# Patient Record
Sex: FEMALE | Race: BLACK OR AFRICAN AMERICAN | NOT HISPANIC OR LATINO | URBAN - METROPOLITAN AREA
[De-identification: names, ages, dates, MRNs, and addresses within clinical notes are randomized per-mention and may not be internally consistent; named-entity substitution may affect disease eponyms.]

---

## 2022-02-21 ENCOUNTER — EMERGENCY (EMERGENCY)
Facility: HOSPITAL | Age: 37
LOS: 1 days | Discharge: HOME | End: 2022-02-21
Attending: STUDENT IN AN ORGANIZED HEALTH CARE EDUCATION/TRAINING PROGRAM
Payer: COMMERCIAL

## 2022-02-21 VITALS
DIASTOLIC BLOOD PRESSURE: 73 MMHG | OXYGEN SATURATION: 100 % | TEMPERATURE: 98 F | RESPIRATION RATE: 21 BRPM | HEART RATE: 81 BPM | WEIGHT: 184.97 LBS | SYSTOLIC BLOOD PRESSURE: 120 MMHG | HEIGHT: 69 IN

## 2022-02-21 VITALS
SYSTOLIC BLOOD PRESSURE: 115 MMHG | RESPIRATION RATE: 18 BRPM | DIASTOLIC BLOOD PRESSURE: 76 MMHG | OXYGEN SATURATION: 98 % | HEART RATE: 62 BPM

## 2022-02-21 PROCEDURE — 99284 EMERGENCY DEPT VISIT MOD MDM: CPT

## 2022-02-21 PROCEDURE — 99283 EMERGENCY DEPT VISIT LOW MDM: CPT

## 2022-02-21 RX ORDER — DIAZEPAM 5 MG
5 TABLET ORAL ONCE
Refills: 0 | Status: DISCONTINUED | OUTPATIENT
Start: 2022-02-21 | End: 2022-02-21

## 2022-02-21 RX ORDER — LIDOCAINE 4 G/100G
1 CREAM TOPICAL ONCE
Refills: 0 | Status: COMPLETED | OUTPATIENT
Start: 2022-02-21 | End: 2022-02-21

## 2022-02-21 RX ORDER — ACETAMINOPHEN 500 MG
975 TABLET ORAL ONCE
Refills: 0 | Status: COMPLETED | OUTPATIENT
Start: 2022-02-21 | End: 2022-02-21

## 2022-02-21 RX ADMIN — LIDOCAINE 1 PATCH: 4 CREAM TOPICAL at 19:22

## 2022-02-21 RX ADMIN — Medication 5 MILLIGRAM(S): at 19:22

## 2022-02-21 RX ADMIN — Medication 975 MILLIGRAM(S): at 19:23

## 2022-02-21 NOTE — ED ADULT NURSE REASSESSMENT NOTE - NS ED NURSE REASSESS COMMENT FT1
MD informed this RN that pt is ok to be discharged. Pt still endorses left lower back pain but is ok to f/u outpatient.   RN assisted pt with putting her clothing on. PCT assisted pt into wheelchair to the waiting room. Pt left with all of her personal belongings.

## 2022-02-21 NOTE — ED PROVIDER NOTE - PATIENT PORTAL LINK FT
You can access the FollowMyHealth Patient Portal offered by [facility] by registering at the following website: [email or web address]. By joining [facility]’s FollowMyHealth portal, you will also be able to view your health information using other applications (apps) compatible with our system.

## 2022-02-21 NOTE — ED PROVIDER NOTE - OBJECTIVE STATEMENT
36 y.o female with pmhx of sciatica presents to the ED with c.o severe L hip pain. Pt states pain started 9 days ago, explaining she felt the muscles around her hip feel tight. She denies any injury or repetitive motions/heavy lifting. Was able to maintain her normal activity level at work and for exercise until pain gradually worsened. Over the last 3 days her pain has been increasing, and is no longer relived with Naproxen twice a day or Gabapentin. She presents tonight because while she was in the shower she felt her pain get worse, saying it now radiates to her leg, and that it is now difficult to ambulate. States pain is more severe than her usual episodes of sciatica, but endorses numbness which she says is typical for episodes of sciatica. Denies focal weakness.  Pt followed by a rheumatologist, however she denies any personal or family hx of lupus or RA.     She denies any fever, chills, knee pain, back pain, or swelling/redness of the skin of her hip. 36 y.o female with pmhx of sciatica presents to the ED with c.o severe L sacral pain. Pt states pain started 9 days ago, explaining she felt the muscles around her left hip/lower back feel tight. She denies any injury or repetitive motions/heavy lifting. Was able to maintain her normal activity level at work and for exercise until pain gradually worsened. Over the last 3 days her pain has been increasing, and is no longer relived with Naproxen twice a day or Gabapentin. She presents tonight because while she was in the shower she felt her pain get worse, saying it now radiates to her leg, and that it is now difficult to ambulate. States pain is more severe than her usual episodes of sciatica, but endorses numbness which she says is typical for episodes of sciatica. Denies focal weakness.  Pt followed by a rheumatologist, however she denies any personal or family hx of cancer, lupus, or RA. Last took 2 Naproxen at 4pm.  She denies any fever, chills, incontinence, knee pain, or swelling/redness of the skin of her hip.

## 2022-02-21 NOTE — ED PROVIDER NOTE - ATTENDING CONTRIBUTION TO CARE
Attending Ashley: I performed a history and physical exam of the patient and discussed their management with the resident/fellow/ACP/student. I have reviewed the resident/fellow/ACP/student note and agree with the documented findings and plan of care, except as noted. I have personally performed a substantive portion of the visit including all aspects of the medical decision making. My medical decision making and observations are found above. Please refer to any progress notes for updates on clinical course.

## 2022-02-21 NOTE — ED ADULT NURSE NOTE - OBJECTIVE STATEMENT
35 y/o female PMH sciatica presents to ED reporting L hip pain. Pt reports L lower back/hip pain, worsening the past few days. On exam, AOx3, speaking in complete sentences. Unlabored, spontaneous respirations, NAD. Equal strength and sensation in all 4 extremities. Pt denies loss of control of bladder/bowel or numbness/tingling of extremities. Awaiting evaluation by MD at this time.

## 2022-02-21 NOTE — ED ADULT NURSE NOTE - NSIMPLEMENTINTERV_GEN_ALL_ED
Implemented All Fall Risk Interventions:  [place] to call system. Call bell, personal items and telephone within reach. Instruct patient to call for assistance. Room bathroom lighting operational. Non-slip footwear when patient is off stretcher. Physically safe environment: no spills, clutter or unnecessary equipment. Stretcher in lowest position, wheels locked, appropriate side rails in place. Provide visual cue, wrist band, yellow gown, etc. Monitor gait and stability. Monitor for mental status changes and reorient to person, place, and time. Review medications for side effects contributing to fall risk. Reinforce activity limits and safety measures with patient and family.

## 2022-02-21 NOTE — ED PROVIDER NOTE - PHYSICAL EXAMINATION
GENERAL: Awake, alert and interacting appropriately, mild distress, uncomfortable appearing   HEENT: moist mucous membranes  NECK: no visible mass or JVD  CARDIAC: Regular rate and rhythm  PULM: Clear to auscultation bilaterally  ABDOMEN:  nondistended  MSK: +L SI joint tenderness. No midline tenderness. Antalgic gait. Pain exacerbated w/ hip flexion.   NEURO: 5/5 strength hip flexion, dorsiflexion, plantarflexion bilaterally. Sensation intact.    SKIN: No overlaying rash, swelling, or erythema L hip/sacrum.

## 2022-02-21 NOTE — ED ADULT TRIAGE NOTE - CHIEF COMPLAINT QUOTE
left hip pain down left upper thigh left hip pain down left upper thigh since last saturday. denies injury

## 2022-02-21 NOTE — ED PROVIDER NOTE - CLINICAL SUMMARY MEDICAL DECISION MAKING FREE TEXT BOX
adult F w/ a hx of sciatic back pain presenting w/ L LBP w/out red flags for same. vss. neurologically intact. low suspicion for acute cord compression, cauda equina, fracture, infections process of the spine. will tx for likely acute exacerbation of known sciatica. tylenol, valium and lidocaine patch. re-eval. likely d/c w/ spine center follow up. adult F w/ a hx of sciatic back pain presenting w/ L LBP w/out red flags for same. vss. neurologically intact. Do not suspect acute cord compression, cauda equina, fracture, infections process of the spine. will tx for likely acute exacerbation of known sciatica. tylenol, valium and lidocaine patch. re-eval. likely d/c w/ spine center follow up.    Attending Ashley: 37 y/o F w/ PMH of L sided sciatica, b/l tubal ligation presenting w/ back pain. Seen in blue. Pt reports over 1 week ago developed L lower back pain w/ radiation to L leg. Felt similar to previous sciatica pain. Over the past few days and today specifically pain worsened to the point where she is having difficulty walking. Has been taking NSAIDs and gabapentin (prescribed by rheum last week) which has not be providing much pain relief. Denies any numbness or tingling in legs. Endorsing pain causing her legs to feel weak, but has been able to walk despite it being painful. No trauma to the area. No drug use. Pt uncomfortable appearing on exam, but no acute distress. Lungs clear. HR regular. Abd nondistended/soft/nontender. No midline spinal tenderness. Tender superior L glute. Str 5/5 x4. No appreciable extremity sensory deficits. Pt w/ back pain radiating to L leg. Likely sciatica pain given symptoms. No red flag symptoms to suggest cord pathology. Will provide analgesia. Will reassess the need for additional interventions as clinically warranted.

## 2022-02-21 NOTE — ED PROVIDER NOTE - PROGRESS NOTE DETAILS
Pain minimally improved after Tylenol, Valium, lidocaine patch. Steve Silva M.D. (PGY-1): in depth discussion had w/ patient. discussed managing pain control goals. pt understanding. offered admission for pain management, which pt declined. plan = discharge w/ gradual rehabilitation while at home. offered 7 days off work for this, which pt accepted. rapid referral made to spine center for further management of likely sciatica. pt endorses plan and is happy with the discussion we had. ED return precautions discussed.

## 2022-02-21 NOTE — ED PROVIDER NOTE - NSFOLLOWUPINSTRUCTIONS_ED_ALL_ED_FT
Follow up with the Spine Center. You will be called with the details of your appointment within 24-48 hours.  the phone even if you do not recognize the number. Contact the ED if you have difficulties making this appointment.     Immediately return to the Emergency Department for any new or markedly worsening symptoms.    Use information packed handed with your paperwork for recommendations on management of your sciatic pain.

## 2022-02-21 NOTE — ED ADULT NURSE REASSESSMENT NOTE - NS ED NURSE REASSESS COMMENT FT1
Pt a&ox4, spontaneous breathing and equal chest rise. Pt c/o left low back pain. PO meds administered per MD's order. Pt placed on purewick because she states she cannot walk or sit on a bedpan without experiencing severe pain. VSS on RA. Oriented pt to call bell use. Bed locked and in lowest position. Will continue to closely monitor pt.

## 2022-02-21 NOTE — ED PROVIDER NOTE - NS ED ROS FT
CONSTITUTIONAL: No weakness, fatigue, fevers or chills, significant weight loss or gain  HEENT: No neck pain or stiffness  RESPIRATORY: No cough, wheezing, hemoptysis; No shortness of breath  CARDIOVASCULAR: No chest pain or palpitations  GASTROINTESTINAL: No abdominal or epigastric pain  GENITOURINARY: No dysuria, frequency or hematuria  MUSCULOSKELETAL: + hip pain/sacral pain   NEUROLOGIC: + paresthesias, no weakness  ENDOCRINOLOGIC: No polyuria or polydipsia  HEMATOLOGIC: No bruising, bleeding, pallor or jaundice  SKIN: No rashes

## 2023-04-09 ENCOUNTER — EMERGENCY (EMERGENCY)
Facility: HOSPITAL | Age: 38
LOS: 1 days | Discharge: HOME | End: 2023-04-09
Attending: STUDENT IN AN ORGANIZED HEALTH CARE EDUCATION/TRAINING PROGRAM | Admitting: STUDENT IN AN ORGANIZED HEALTH CARE EDUCATION/TRAINING PROGRAM
Payer: COMMERCIAL

## 2023-04-09 VITALS
RESPIRATION RATE: 16 BRPM | SYSTOLIC BLOOD PRESSURE: 120 MMHG | OXYGEN SATURATION: 100 % | DIASTOLIC BLOOD PRESSURE: 78 MMHG | HEART RATE: 81 BPM | TEMPERATURE: 98 F

## 2023-04-09 LAB
ALBUMIN SERPL ELPH-MCNC: 4.2 G/DL — SIGNIFICANT CHANGE UP (ref 3.3–5)
ALP SERPL-CCNC: 63 U/L — SIGNIFICANT CHANGE UP (ref 40–120)
ALT FLD-CCNC: 14 U/L — SIGNIFICANT CHANGE UP (ref 4–33)
ANION GAP SERPL CALC-SCNC: 15 MMOL/L — HIGH (ref 7–14)
AST SERPL-CCNC: 17 U/L — SIGNIFICANT CHANGE UP (ref 4–32)
BASOPHILS # BLD AUTO: 0.02 K/UL — SIGNIFICANT CHANGE UP (ref 0–0.2)
BASOPHILS NFR BLD AUTO: 0.3 % — SIGNIFICANT CHANGE UP (ref 0–2)
BILIRUB SERPL-MCNC: 0.2 MG/DL — SIGNIFICANT CHANGE UP (ref 0.2–1.2)
BUN SERPL-MCNC: 8 MG/DL — SIGNIFICANT CHANGE UP (ref 7–23)
CALCIUM SERPL-MCNC: 8.7 MG/DL — SIGNIFICANT CHANGE UP (ref 8.4–10.5)
CHLORIDE SERPL-SCNC: 105 MMOL/L — SIGNIFICANT CHANGE UP (ref 98–107)
CO2 SERPL-SCNC: 19 MMOL/L — LOW (ref 22–31)
CREAT SERPL-MCNC: 0.84 MG/DL — SIGNIFICANT CHANGE UP (ref 0.5–1.3)
EGFR: 91 ML/MIN/1.73M2 — SIGNIFICANT CHANGE UP
EOSINOPHIL # BLD AUTO: 0.19 K/UL — SIGNIFICANT CHANGE UP (ref 0–0.5)
EOSINOPHIL NFR BLD AUTO: 3.1 % — SIGNIFICANT CHANGE UP (ref 0–6)
FLUAV AG NPH QL: SIGNIFICANT CHANGE UP
FLUBV AG NPH QL: SIGNIFICANT CHANGE UP
GLUCOSE SERPL-MCNC: 100 MG/DL — HIGH (ref 70–99)
HCG SERPL-ACNC: <5 MIU/ML — SIGNIFICANT CHANGE UP
HCT VFR BLD CALC: 41 % — SIGNIFICANT CHANGE UP (ref 34.5–45)
HGB BLD-MCNC: 12.8 G/DL — SIGNIFICANT CHANGE UP (ref 11.5–15.5)
IANC: 3.91 K/UL — SIGNIFICANT CHANGE UP (ref 1.8–7.4)
IMM GRANULOCYTES NFR BLD AUTO: 0.2 % — SIGNIFICANT CHANGE UP (ref 0–0.9)
LYMPHOCYTES # BLD AUTO: 1.07 K/UL — SIGNIFICANT CHANGE UP (ref 1–3.3)
LYMPHOCYTES # BLD AUTO: 17.5 % — SIGNIFICANT CHANGE UP (ref 13–44)
MCHC RBC-ENTMCNC: 26.4 PG — LOW (ref 27–34)
MCHC RBC-ENTMCNC: 31.2 GM/DL — LOW (ref 32–36)
MCV RBC AUTO: 84.5 FL — SIGNIFICANT CHANGE UP (ref 80–100)
MONOCYTES # BLD AUTO: 0.93 K/UL — HIGH (ref 0–0.9)
MONOCYTES NFR BLD AUTO: 15.2 % — HIGH (ref 2–14)
NEUTROPHILS # BLD AUTO: 3.91 K/UL — SIGNIFICANT CHANGE UP (ref 1.8–7.4)
NEUTROPHILS NFR BLD AUTO: 63.7 % — SIGNIFICANT CHANGE UP (ref 43–77)
NRBC # BLD: 0 /100 WBCS — SIGNIFICANT CHANGE UP (ref 0–0)
NRBC # FLD: 0 K/UL — SIGNIFICANT CHANGE UP (ref 0–0)
PLATELET # BLD AUTO: 200 K/UL — SIGNIFICANT CHANGE UP (ref 150–400)
POTASSIUM SERPL-MCNC: 3.3 MMOL/L — LOW (ref 3.5–5.3)
POTASSIUM SERPL-SCNC: 3.3 MMOL/L — LOW (ref 3.5–5.3)
PROT SERPL-MCNC: 7.2 G/DL — SIGNIFICANT CHANGE UP (ref 6–8.3)
RBC # BLD: 4.85 M/UL — SIGNIFICANT CHANGE UP (ref 3.8–5.2)
RBC # FLD: 13.9 % — SIGNIFICANT CHANGE UP (ref 10.3–14.5)
RSV RNA NPH QL NAA+NON-PROBE: SIGNIFICANT CHANGE UP
SARS-COV-2 RNA SPEC QL NAA+PROBE: SIGNIFICANT CHANGE UP
SODIUM SERPL-SCNC: 139 MMOL/L — SIGNIFICANT CHANGE UP (ref 135–145)
WBC # BLD: 6.13 K/UL — SIGNIFICANT CHANGE UP (ref 3.8–10.5)
WBC # FLD AUTO: 6.13 K/UL — SIGNIFICANT CHANGE UP (ref 3.8–10.5)

## 2023-04-09 PROCEDURE — 99284 EMERGENCY DEPT VISIT MOD MDM: CPT

## 2023-04-09 RX ORDER — ONDANSETRON 8 MG/1
4 TABLET, FILM COATED ORAL ONCE
Refills: 0 | Status: COMPLETED | OUTPATIENT
Start: 2023-04-09 | End: 2023-04-09

## 2023-04-09 RX ORDER — POTASSIUM CHLORIDE 20 MEQ
40 PACKET (EA) ORAL ONCE
Refills: 0 | Status: COMPLETED | OUTPATIENT
Start: 2023-04-09 | End: 2023-04-09

## 2023-04-09 RX ORDER — SODIUM CHLORIDE 9 MG/ML
1000 INJECTION INTRAMUSCULAR; INTRAVENOUS; SUBCUTANEOUS ONCE
Refills: 0 | Status: COMPLETED | OUTPATIENT
Start: 2023-04-09 | End: 2023-04-09

## 2023-04-09 RX ADMIN — Medication 40 MILLIEQUIVALENT(S): at 17:18

## 2023-04-09 RX ADMIN — ONDANSETRON 4 MILLIGRAM(S): 8 TABLET, FILM COATED ORAL at 15:40

## 2023-04-09 RX ADMIN — SODIUM CHLORIDE 1000 MILLILITER(S): 9 INJECTION INTRAMUSCULAR; INTRAVENOUS; SUBCUTANEOUS at 15:40

## 2023-04-09 NOTE — ED PROVIDER NOTE - NSFOLLOWUPINSTRUCTIONS_ED_ALL_ED_FT
Nausea / Vomiting    Nausea is the feeling that you have to vomit. As nausea gets worse, it can lead to vomiting. Vomiting puts you at an increased risk for dehydration. Older adults and people with other diseases or a weak immune system are at higher risk for dehydration. Drink clear fluids in small but frequent amounts as tolerated. Eat bland, easy-to-digest foods in small amounts as tolerated.    SEEK IMMEDIATE MEDICAL CARE IF YOU HAVE ANY OF THE FOLLOWING SYMPTOMS: fever, inability to keep sufficient fluids down, black or bloody vomitus, black or bloody stools, lightheadedness/dizziness, chest pain, severe headache, rash, shortness of breath, cold or clammy skin, confusion, pain with urination, or any signs of dehydration.    STAY HYDRATED!    Please follow up with your primary medical doctor within two days.  Return to the emergency department if you feel that your condition is worsening    You can take Tylenol or Advil for pain. Please follow the instructions on the bottles.

## 2023-04-09 NOTE — ED PROVIDER NOTE - OBJECTIVE STATEMENT
37-year-old female history of H. pylori in February 2023, sciatica presenting with complaints of diarrhea associated with nausea.  Patient states symptoms started 2 days ago.  Up to 6 episodes of nonbloody diarrhea daily.  States sick contacts at home with son who has similar symptoms.  Denies any recent antibiotic use, or recent travel.  Denies fever, blood in stool, vomiting.  States she is taking Imodium x2 and has had decreased p.o. intake.  States last menstrual period was March 21.  Denies any chest pain, shortness of breath, abdominal pain, urinary complaints.  States her H. pylori was diagnosed with urea breath test however she has no abdominal pain at this point.

## 2023-04-09 NOTE — ED PROVIDER NOTE - PATIENT PORTAL LINK FT
You can access the FollowMyHealth Patient Portal offered by [facility] by registering at the following website: [email or web address]. By joining [employer]’s FollowMyHealth portal, you will also be able to view your health information using other applications (apps) compatible with our system.

## 2023-04-09 NOTE — ED ADULT TRIAGE NOTE - CHIEF COMPLAINT QUOTE
Pt c/o diarrhea for several days, nausea without vomiting, some abd discomfort. Pt reports feeling weak from diarrhea. Pt's son is sick with same symptoms.

## 2023-04-09 NOTE — ED PROVIDER NOTE - ATTENDING CONTRIBUTION TO CARE
I have personally performed a face to face medical and diagnostic evaluation of the patient. I have discussed with and reviewed the Resident's note and agree with the History, ROS, Physical Exam and MDM unless otherwise indicated. A brief summary of my personal evaluation and impression can be found below.    Emely INFANTE: 37-year-old female history of H. pylori, presents with a chief complaint of repeated episodes of nonbloody watery diarrhea over the last few days no recent antibiotics last was on antibiotics in February for H. pylori, sick contacts at home with similar symptoms, endorsing diffuse crampy abdominal pain churning sensation that is relieved when she passes a bowel movement.  No urinary symptoms, is feeling mildly lightheaded and weak and she is concerned she is dehydrated no vomiting no chest pain no shortness of breath no rashes no urinary complaints otherwise.  No recent travel.    All other ROS negative, except as above and as per HPI and ROS section.    VITALS: Initial triage and subsequent vitals have been reviewed by me.  GEN APPEARANCE: Alert, non-toxic, well-appearing, NAD.  HEAD: Atraumatic.  EYES: PERRLa, EOMI, vision grossly intact.   NECK: Supple  CV: RRR, S1S2, no c/r/m/g. Cap refill <2 seconds. No bruits.   LUNGS: CTAB. No abnormal breath sounds.  ABDOMEN: Soft, NTND. No guarding or rebound.   MSK/EXT: No spinal or extremity point tenderness. No CVA ttp. Pelvis stable. No peripheral edema.  NEURO: Alert, follows commands. Weight bearing normal. Speech normal. Sensation and motor normal x4 extremities.   SKIN: Warm, dry and intact. No rash.  PSYCH: Appropriate    Plan/MDM: Exam vital stable nontoxic-appearing physical exam as above, DDx concern for likely viral gastroenteritis in setting with sick contacts at home, low concern for intra-abdominal surgical pathology as abdomen is largely benign, vitals are reassuring, plan to check basic labs give meds as needed and reassess.

## 2023-04-09 NOTE — ED ADULT NURSE NOTE - OBJECTIVE STATEMENT
Received pt to intake 2, A+Ox4, ambulatory. C/O diarrhea x 3 days, endorsing nausea. pt states whole family had stomach bug. Respirations even and unlabored, normal work of breathing, no accessory muscle use, speaking in full clear uninterrupted sentences. ABD is soft, non tender, non distended. Pt denies any chest pain, SOB, headache, dizziness, fever, chills.  20G to LAC, Labs sent, Medicated as per MD, will continue to monitor.

## 2023-04-09 NOTE — ED PROVIDER NOTE - CLINICAL SUMMARY MEDICAL DECISION MAKING FREE TEXT BOX
37-year-old female history of H. pylori in February 2023, sciatica presenting with complaints of diarrhea associated with nausea.  Patient states symptoms started 2 days ago.  Up to 6 episodes of nonbloody diarrhea daily.  States sick contacts at home with son who has similar symptoms.  Denies any recent antibiotic use, or recent travel.  Denies fever, blood in stool, vomiting.  States she is taking Imodium x2 and has had decreased p.o. intake.    Vital signs stable, afebrile, not hypoxic. Nonfocal PE. Will obtain basic labs, flu with covid test. Will give fluids and zofran. Suspecting GI virus such as norovirus with sick contacts in child.  Differential diagnosis includes but not limited to viral gastroenteritis vs. viral syndrome. Dispo pending reassessment 37-year-old female history of H. pylori in February 2023, sciatica presenting with complaints of diarrhea associated with nausea.  Patient states symptoms started 2 days ago.  Up to 6 episodes of nonbloody diarrhea daily.  States sick contacts at home with son who has similar symptoms.  Denies any recent antibiotic use, or recent travel.  Denies fever, blood in stool, vomiting.  States she is taking Imodium x2 and has had decreased p.o. intake.    Vital signs stable, afebrile, not hypoxic. Nonfocal PE. Will obtain basic labs, flu with covid test. Will give fluids and zofran. Suspecting GI virus such as norovirus with sick contacts in child.  Differential diagnosis includes but not limited to viral gastroenteritis vs. viral syndrome vs. infectious etiology. Dispo pending reassessment

## 2023-04-09 NOTE — ED PROVIDER NOTE - DISCHARGE DATE
Patient has a referral to see Dr. Cristina Aguilera for Rectal adenocarcinoma   DX LAP  Referred by Marco Alvarado MD in [address] [place] [address] [place] [facility].  Link to appointment 09-Apr-2023

## 2023-04-09 NOTE — ED PROVIDER NOTE - NS ED ROS FT
GENERAL: No fever or chills  EYES: No change in vision  HEENT: No trouble swallowing or speaking  CARDIAC: No chest pain  PULMONARY: No cough or SOB  GI: +nausea, +diarrhea   : No changes in urination  SKIN: No rashes  NEURO: No headache, no numbness  MSK: No joint pain  Otherwise as HPI or negative.

## 2023-04-09 NOTE — ED PROVIDER NOTE - PHYSICAL EXAMINATION
Karlos Kemp DO (PGY1)   Physical Exam:    Gen: NAD, AOx3  Head: NCAT  HEENT: EOMI, PEERLA  Lung: CTAB  CV: RRR, no murmurs, rubs or gallops  Abd: soft, NT, ND, no guarding, no rigidity, no rebound tenderness, no CVA tenderness   MSK: no visible deformities, ROM normal in UE/LE, no back pain  Neuro: No focal sensory or motor deficits  Skin: Warm, well perfused, no rash, no leg swelling

## 2023-04-09 NOTE — ED ADULT NURSE REASSESSMENT NOTE - NS ED NURSE REASSESS COMMENT FT1
Pt A&Ox4 to be discharged at this time. IV discontinued by provider. D/C instructions provided. RR equal and unlabored. Comfort measures provided. Medicated as ordered. Safety maintained.

## 2023-04-09 NOTE — ED PROVIDER NOTE - PROGRESS NOTE DETAILS
Resident: Karlos Kemp, PGY1 – Pt was re-evaluated at bedside, VSS, feeling better overall. Results were discussed with patient as well as return precautions and follow up plan with PCP and/or specialist. Time was taken to answer any questions that the patient had before providing them with discharge paperwork. Repleted K+ of 3.3. Patient tolerated PO. Will d/c

## 2023-04-10 PROBLEM — M54.30 SCIATICA, UNSPECIFIED SIDE: Chronic | Status: ACTIVE | Noted: 2022-02-21

## 2023-05-14 NOTE — ED ADULT NURSE NOTE - NSHOSCREENINGQ1_ED_ALL_ED
Patient discharged with v/s stable. Written and verbal after care instructions 
given and explained. 

Patient verbalized understanding. Ambulatory with steady gait. All questions 
addressed prior to discharge. Advised to follow up with PMD. No

## 2023-11-10 NOTE — ED ADULT NURSE NOTE - PRIMARY CARE PROVIDER
Called and spoke with pt. Pt states they do not want to schedule TTE at this time as winter is coming and would rather talk to PCP first to make sure he really needs the test. Will defer order in wq   unknown